# Patient Record
Sex: FEMALE | URBAN - METROPOLITAN AREA
[De-identification: names, ages, dates, MRNs, and addresses within clinical notes are randomized per-mention and may not be internally consistent; named-entity substitution may affect disease eponyms.]

---

## 2023-10-12 ENCOUNTER — APPOINTMENT (RX ONLY)
Dept: URBAN - METROPOLITAN AREA CLINIC 116 | Facility: CLINIC | Age: 83
Setting detail: DERMATOLOGY
End: 2023-10-12

## 2023-10-12 DIAGNOSIS — K05.11 CHRONIC GINGIVITIS, NON-PLAQUE INDUCED: ICD-10-CM

## 2023-10-12 PROCEDURE — ? COUNSELING

## 2023-10-12 PROCEDURE — 99203 OFFICE O/P NEW LOW 30 MIN: CPT

## 2023-10-12 PROCEDURE — ? PRESCRIPTION

## 2023-10-12 RX ORDER — FLUOCINONIDE 0.5 MG/G
GEL TOPICAL BID
Qty: 30 | Refills: 1 | COMMUNITY
Start: 2023-10-12

## 2023-10-12 RX ADMIN — FLUOCINONIDE: 0.5 GEL TOPICAL at 00:00

## 2023-10-12 ASSESSMENT — LOCATION SIMPLE DESCRIPTION DERM
LOCATION SIMPLE: RIGHT BUCCAL MUCOSA
LOCATION SIMPLE: LEFT BUCCAL MUCOSA

## 2023-10-12 ASSESSMENT — LOCATION DETAILED DESCRIPTION DERM
LOCATION DETAILED: RIGHT BUCCAL MUCOSA
LOCATION DETAILED: LEFT BUCCAL MUCOSA

## 2023-10-12 ASSESSMENT — LOCATION ZONE DERM: LOCATION ZONE: MUCOUS_MEMBRANE

## 2023-11-02 ENCOUNTER — APPOINTMENT (RX ONLY)
Dept: URBAN - METROPOLITAN AREA CLINIC 116 | Facility: CLINIC | Age: 83
Setting detail: DERMATOLOGY
End: 2023-11-02

## 2023-11-02 DIAGNOSIS — K05.11 CHRONIC GINGIVITIS, NON-PLAQUE INDUCED: ICD-10-CM

## 2023-11-02 PROCEDURE — 99213 OFFICE O/P EST LOW 20 MIN: CPT

## 2023-11-02 PROCEDURE — ? PRESCRIPTION

## 2023-11-02 PROCEDURE — ? COUNSELING

## 2023-11-02 PROCEDURE — ? TREATMENT REGIMEN

## 2023-11-02 RX ORDER — PREDNISONE 20 MG/1
TABLET ORAL
Qty: 50 | Refills: 0 | COMMUNITY
Start: 2023-11-02

## 2023-11-02 RX ADMIN — PREDNISONE: 20 TABLET ORAL at 00:00

## 2023-11-02 ASSESSMENT — LOCATION SIMPLE DESCRIPTION DERM
LOCATION SIMPLE: RIGHT BUCCAL MUCOSA
LOCATION SIMPLE: LEFT BUCCAL MUCOSA

## 2023-11-02 ASSESSMENT — LOCATION ZONE DERM: LOCATION ZONE: MUCOUS_MEMBRANE

## 2023-11-02 ASSESSMENT — LOCATION DETAILED DESCRIPTION DERM
LOCATION DETAILED: RIGHT BUCCAL MUCOSA
LOCATION DETAILED: LEFT BUCCAL MUCOSA

## 2023-11-02 NOTE — PROCEDURE: TREATMENT REGIMEN
Discontinue Regimen: fluocinonide 0.05 % topical gel BID
Detail Level: Zone
Continue Regimen: Nystatin prescribed by ER.

## 2023-11-27 ENCOUNTER — APPOINTMENT (RX ONLY)
Dept: URBAN - METROPOLITAN AREA CLINIC 116 | Facility: CLINIC | Age: 83
Setting detail: DERMATOLOGY
End: 2023-11-27

## 2023-11-27 DIAGNOSIS — K05.11 CHRONIC GINGIVITIS, NON-PLAQUE INDUCED: ICD-10-CM | Status: IMPROVED

## 2023-11-27 PROCEDURE — ? TREATMENT REGIMEN

## 2023-11-27 PROCEDURE — ? PRESCRIPTION

## 2023-11-27 PROCEDURE — ? COUNSELING

## 2023-11-27 PROCEDURE — 99213 OFFICE O/P EST LOW 20 MIN: CPT

## 2023-11-27 RX ORDER — PREDNISONE 20 MG/1
TABLET ORAL
Qty: 45 | Refills: 0 | Status: CANCELLED

## 2023-11-27 ASSESSMENT — LOCATION SIMPLE DESCRIPTION DERM
LOCATION SIMPLE: LEFT BUCCAL MUCOSA
LOCATION SIMPLE: RIGHT BUCCAL MUCOSA

## 2023-11-27 ASSESSMENT — LOCATION DETAILED DESCRIPTION DERM
LOCATION DETAILED: RIGHT BUCCAL MUCOSA
LOCATION DETAILED: LEFT BUCCAL MUCOSA

## 2023-11-27 ASSESSMENT — LOCATION ZONE DERM: LOCATION ZONE: MUCOUS_MEMBRANE

## 2023-11-27 NOTE — PROCEDURE: TREATMENT REGIMEN
Discontinue Regimen: fluocinonide 0.05 % topical gel BID\\nthe remaining pills of prednisone, patient should of been on 1QAM at this point, will start a new prescription
Detail Level: Zone
Initiate Treatment: Take 1tab po x 30 days then 1tab po every other day afterwards
Continue Regimen: Nystatin prescribed by ER. As needed